# Patient Record
Sex: MALE | URBAN - METROPOLITAN AREA
[De-identification: names, ages, dates, MRNs, and addresses within clinical notes are randomized per-mention and may not be internally consistent; named-entity substitution may affect disease eponyms.]

---

## 2021-08-17 ENCOUNTER — OFFICE VISIT (OUTPATIENT)
Dept: PSYCHIATRY | Facility: CLINIC | Age: 26
End: 2021-08-17
Payer: COMMERCIAL

## 2021-08-17 DIAGNOSIS — F29 PSYCHOSIS, UNSPECIFIED PSYCHOSIS TYPE (H): Primary | ICD-10-CM

## 2021-08-17 ASSESSMENT — PATIENT HEALTH QUESTIONNAIRE - PHQ9: SUM OF ALL RESPONSES TO PHQ QUESTIONS 1-9: 14

## 2021-08-17 ASSESSMENT — ANXIETY QUESTIONNAIRES
1. FEELING NERVOUS, ANXIOUS, OR ON EDGE: MORE THAN HALF THE DAYS
3. WORRYING TOO MUCH ABOUT DIFFERENT THINGS: MORE THAN HALF THE DAYS
5. BEING SO RESTLESS THAT IT IS HARD TO SIT STILL: SEVERAL DAYS
7. FEELING AFRAID AS IF SOMETHING AWFUL MIGHT HAPPEN: SEVERAL DAYS
6. BECOMING EASILY ANNOYED OR IRRITABLE: MORE THAN HALF THE DAYS
2. NOT BEING ABLE TO STOP OR CONTROL WORRYING: SEVERAL DAYS

## 2021-08-17 NOTE — PROGRESS NOTES
"[facility] Clinical Assessment of Need  A Part of the [facility] First Episode of Psychosis Program    Patient: Sixto Gomez (1995, 26 year old)     MRN: [id number]  Date:  8/17/21  Clinician: RAVI Farrell     Length of Actual Contact: Start Time: 11; End Time: 12  People present:  Writer, Individual,   Mode of communication: in person    Reviewed limits to confidentiality: Yes  Verbal consent provided to speak with N/A.  Mental Health Commitment, No. If so, provide name, county or agency, and phone number here. With [place] No  Does this pt have a legal guardian No. If so, request a copy of the paperwork   Chief Complaint    \"A few weeks ago I had to call 911 because I was having a hard time and feeling on edge. I was making weird associations and couldn't make up my mind about really simple things. I thought I was my twin brother\"    History of Presenting Illness:    Sixto Gomez  is a 26 year old male He/Him who presents today in person for a [facility] first-episode psychosis screening. Pt was most recently hospitalized at [facility] from 8/3/21 to 8/7/21. According to the pt's records, the following was noted:  On interview, patient was sitting on a chair, attentive, and concerned with his mental health wellbeing. 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 There were no other symptoms at that time. Patient has had an abrupt change in mood and otptomsher psychiatric sym for the past 1 week. Part of this involved seeing a text from a friend regarding what patient thought was suicidal ideation (in fact the friend was simply saying that he wasn't going to come out to the bar with them). This reminded him of the loss of two friends many years ago, one by suicide. He states he \"just wants them back.\" Patient states he has had periods of being down and that this past week he has had about 7 days " "of having increased energy. Patient states his sleep has been \"awful\" this past week, he wakes up every 1.5 hours, paces when he wakes up, and collectively gets about 6 hours of sleep every night. He is unsure if he is experiencing auditory halluciations and if they are telling him suicidal thoughts. He is unsure if he is \"hearing someone else in his mind,\" as he expresses he's been having memory gaps. He previously had a suicidal attempt at 15 y.o. by suffocation, and was not able to describe whether his mood was overall depressed at that time. Patient endorses he sometimes sees signs and watches tv where he makes associations to his life. He recalls that for a time he found himself selecting his brother's shirts then wearing his brother's shirt and using his bag, and wondered if this meant that he was his brother. Patient uses marijuana on average daily, his last use was one week ago and now trying to cut back.Through out the history patient frequently lost train of thought and acknowledged he forgot his words. Patient does not think he has had any recent seizure activity, has not woken up on the floor, though worries he has memory gaps over the past week. There is not any SI or HI. No delusions      Psychosocial information (home, school, and/or employment information; pertinent info. 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 Recently moved to [facility] from [place]. Has many friends, twin brother and supportive parents.      Hoped for outcomes:    Would like therapy, either DBT or CBT   Medication management       Self-reported psychosis symptoms: Frequency/duration  Auditory Hallucinations: none but says he had \"my own voice saying mean things about myself\"  Visual Hallucinations: yes - photos in his mom's home looked as if they were moving, warping, sees shadows   Delusions/paranoia: Yes - all eyes are on me - everyone is looking at " "me. In the hospital people were talking about me   Disorganization/confusion: yes \"big time\" - when I was in the hospital, I couldn't make a decision. Last week I was still really confused. Couldn't decide what to eat, what to do with my day, who to talk to. I couldn't decide whether to use hot or cold water in the shower\". \"My mind goes blank, and I couldn't come up with things to say, even to my friends of over a decade\".   Catatonia: denies  Social isolation: Yes - not wanting to engage with friends or family    Self-reported simone symptoms: Frequency/duration  Sleeplessness:  Yes - on hyper alert 3-6 hours a night.   Impulsive behaviors: yes   Grandiosity: no  Racing thoughts/pressured speech: none  More energy than usual: \"felt wired in the hospital\" even on 3 hours     Substance use: Frequency/duration  Marijuana: smoked everyday until recent hospitalization  ETOH: yes - social drinker 1x every other week (3-4 beers)  Other drugs (benzodiazepines, opiates/opioids, methamphetamine, prescription drugs, synthetics, OTC): denies    SIB/Suicide: Frequency/duration  Self-harm \"Have you ever injured yourself on purpose without intending to kill yourself?\" In high school  Suicide ideation/attempts: \"Have you ever attempted suicide in your lifetime?\" \"Do you have any thoughts of harming yourself or others today?\" No  If YES: Safety Assessment screening questions      Past Psychiatric History (Brief)     Psychiatric diagnoses, date diagnosed, and associated symptoms   Unspecified psychosis 8/3/21  Marijuana use d/o, mild 8/3/21   Epilepsy 2000 - has only had one seizure      -History of a diagnosis of autism spectrum disorder? No  -History of a diagnosis of borderline personality disorder? No    Psychiatric hospitalization(s), location, admit date, and length of stay  First and only hospitalization at [facility] 8/3-8/7   Uncle has schizophrenia - dad is his legal guardian     Medications, length of use, benefits, and " "unpleasant effects    Risperidone 1 mg for the remainder of the month \"it doesn't feel like its helping me a whole lot\" however reports decrease in anxiety    -History of antipsychotic use (cumulative months)? Yes - <1 mo    Outpatient Programs & Services    Saw psychologist in [address] (6 or 7 years ago)  for cannabis use/depression    Developmental & Medical History (Brief)    Past/Present developmental and/or medical issues, date diagnosed, and impact  Epilepsy diagnosed in 2000, has been managed by medication    -History of significant head injury or loss of consciousness? If yes, history of brain imaging? Yes - when I was 12 mo I fell and hit my head but I don't know if I lost consciousness  -History of a developmental delay or use of an IEP or 504? No    Psychosocial Supports:    Primary supports  Family, friends, dog Mildred    Strengths and coping strategies   Plays luz maria, applies skills learned in the hospital, has friends and family that are supportive    Screening/Assessment Measures:    PHQ9 was completed today, 8/17/21 - see screening tab  Mental Status Exams:  Alertness: alert   Appearance: well groomed  Behavior/Demeanor: cooperative, pleasant and calm, with good  eye contact   Speech: slowed  Language: intact. Preferred language identified as English.  Psychomotor: normal or unremarkable  Mood: depressed  Affect: flat; was congruent to mood; was congruent to content  Thought Process/Associations: unremarkable  Thought Content:  Reports  thought blocking, anxiety;  Denies suicidal and violent ideation  Perception:  Reports visual hallucinations and visual distortion seen as shadows ;  Denies auditory hallucinations  Insight: good  Judgment: good  Cognition: does  appear grossly intact; formal cognitive testing was not done    Techniques Utilized:     Motivational Teaching Strategies:  Connect info and skills with personal goals  Promote hope and positive expectations    Educational Teaching " Strategies:  Review of written material/education  Relate information to client's experience  Ask questions to check comprehension  Break down information into small chunks  Adopt client's language     Psychiatric Diagnosis(es):    Discharge Diagnoses from [facility]:  Unspecified psychosis  Marijuana use d/o, mild  Epilepsy      Assessment/Progress Note:     Sixto Gomez is a 26 year old male He/Him who presented for a psychosis assessment of need visit to determine potential eligibility for participation in [facility] First Episode of Psychosis services. Sixto Gomez was referred by Dr. Sean Montes. Sixto Gomez presented today as a [employer] with [employer]. Sixto Gomez has a lifetime history of 1 hospitalizations and carries psychiatric diagnoses of psychosis, unspecified. Further diagnostic clarification is needed. A psychiatric diagnostic assessment was scheduled with Yesika Webb on 9/9 at 3pm.      Sixto Gomez identified present symptoms to include paranoia, confusion, thought blocking and panic attacks. Psychosocial stressors were identified as access to healthcare and mental health symptoms. Explored Sixto Gomez goal(s) to include completing his graduate degree, repairing relationships and learning how to communicate better with others.     Sixto Gomez is currently participating in no services. He may benefit from services such as therapy, medication management, SEE for the purposes of continuation of care to manage symptoms. Willingness to pursue said services seems likely.     Identified risk factors and/or vulnerabilities include male, recent substance abuse, poor sleep, panic,extreme anxiety, extreme psychic pain and hopelessness, worthlessness. Protective factors and/or strengths identified as educated, employed, goal-focused, has a previous history of therapy, intelligent, open to learning, open to suggestions / feedback, responsible parent,  "support of family, friends and providers and work history. Suicidal ideation was not present at the time of today's visit. Safety plan was discussed and included using his coping skills (grounding, distraction), calling his parents, calling [facility] crisis resources, texting [phone], visiting his local ED.    Sixto Gomez agrees to treatment with the capacity to do so. Agrees to call clinic for any problems. The patient understands to call 911 or come to the nearest ED if life threatening or urgent symptoms present.    Billing for \"Interactive Complexity\"?    No    Plan/Referrals:     Diagnostic Assessment schedule on 9/9 at 3pm.    Also provided referrals for DBT and medication management as [facility] and other FEP clinics have a current wait list    PADMINI Farrell, LGSW    Attestation:    I did not see this patient directly. This patient is discussed with me in individual supervision, and I agree with the plan as documented.     JOSE Ramsey, MSW, LICSW, August 24, 2021      "

## 2021-10-21 ENCOUNTER — OFFICE VISIT (OUTPATIENT)
Dept: PSYCHIATRY | Facility: CLINIC | Age: 26
End: 2021-10-21
Payer: COMMERCIAL

## 2021-10-21 DIAGNOSIS — F29 PSYCHOSIS, UNSPECIFIED PSYCHOSIS TYPE (H): Primary | ICD-10-CM

## 2021-10-21 NOTE — PROGRESS NOTES
"Patient arrived for a [facility] Diagnostic Assessment to determine appropriateness for FEP services. Patient reported, \"I have consulted with my parents about this and I set up appointments with [facility] and I want to go that route.\" He reported no symptoms/episodes of psychosis for 2 months. He reported coming in for today's appointment since it was recommended and scheduled, with the intent of wrapping the assessment process up and informing the clinic he established care elsewhere. He opted not to complete today's appointment. Encouraged he contact clinic if he changes his mind and would like to reschedule. No immediate needs identified.     Yesika Webb, JOSE    "